# Patient Record
Sex: FEMALE | Race: ASIAN | NOT HISPANIC OR LATINO | URBAN - METROPOLITAN AREA
[De-identification: names, ages, dates, MRNs, and addresses within clinical notes are randomized per-mention and may not be internally consistent; named-entity substitution may affect disease eponyms.]

---

## 2017-02-12 ENCOUNTER — EMERGENCY (EMERGENCY)
Facility: HOSPITAL | Age: 37
LOS: 1 days | Discharge: HOME | End: 2017-02-12
Attending: EMERGENCY MEDICINE | Admitting: EMERGENCY MEDICINE
Payer: MEDICAID

## 2017-02-12 VITALS
TEMPERATURE: 98 F | RESPIRATION RATE: 18 BRPM | SYSTOLIC BLOOD PRESSURE: 118 MMHG | DIASTOLIC BLOOD PRESSURE: 73 MMHG | HEART RATE: 84 BPM | OXYGEN SATURATION: 100 %

## 2017-02-12 PROCEDURE — 99283 EMERGENCY DEPT VISIT LOW MDM: CPT

## 2017-02-12 RX ORDER — AMOXICILLIN 250 MG/5ML
1 SUSPENSION, RECONSTITUTED, ORAL (ML) ORAL
Qty: 20 | Refills: 0 | OUTPATIENT
Start: 2017-02-12 | End: 2017-02-22

## 2017-02-12 NOTE — ED PROVIDER NOTE - OBJECTIVE STATEMENT
36y F with no significant PMHx presents to the ED with sore throat x yesterday. Pt notes dysphagia. Pt states she has had throat infection in the past. Denies fevers, chills, vomiting, cough, nasal congestion, or any other complaints. + Sick contact with children. Allergic to cipro.  used for translation with patient's permission.

## 2017-02-12 NOTE — ED PROVIDER NOTE - NS ED MD SCRIBE ATTENDING SCRIBE SECTIONS
REVIEW OF SYSTEMS/DISPOSITION/PAST MEDICAL/SURGICAL/SOCIAL HISTORY/VITAL SIGNS( Pullset)/PHYSICAL EXAM/HIV/HISTORY OF PRESENT ILLNESS

## 2017-02-12 NOTE — ED PROVIDER NOTE - MEDICAL DECISION MAKING DETAILS
36y F presents with sore throat, tonsillar swelling and exudates. No cough or rhinorrhea. Sick children at home with similar symptoms. Concern for strep, will d/c home with prescription for antibiotics and Prednisone.

## 2018-07-17 ENCOUNTER — EMERGENCY (EMERGENCY)
Facility: HOSPITAL | Age: 38
LOS: 1 days | Discharge: HOME | End: 2018-07-17
Admitting: EMERGENCY MEDICINE
Payer: MEDICAID

## 2018-07-17 VITALS
DIASTOLIC BLOOD PRESSURE: 66 MMHG | RESPIRATION RATE: 18 BRPM | SYSTOLIC BLOOD PRESSURE: 105 MMHG | TEMPERATURE: 98 F | OXYGEN SATURATION: 100 % | HEART RATE: 88 BPM

## 2018-07-17 LAB
APPEARANCE UR: CLEAR — SIGNIFICANT CHANGE UP
BACTERIA # UR AUTO: SIGNIFICANT CHANGE UP
BILIRUB UR-MCNC: NEGATIVE — SIGNIFICANT CHANGE UP
BLOOD UR QL VISUAL: NEGATIVE — SIGNIFICANT CHANGE UP
COLOR SPEC: SIGNIFICANT CHANGE UP
GLUCOSE UR-MCNC: NEGATIVE — SIGNIFICANT CHANGE UP
KETONES UR-MCNC: NEGATIVE — SIGNIFICANT CHANGE UP
LEUKOCYTE ESTERASE UR-ACNC: NEGATIVE — SIGNIFICANT CHANGE UP
NITRITE UR-MCNC: NEGATIVE — SIGNIFICANT CHANGE UP
PH UR: 6.5 — SIGNIFICANT CHANGE UP (ref 4.6–8)
PROT UR-MCNC: NEGATIVE MG/DL — SIGNIFICANT CHANGE UP
RBC CASTS # UR COMP ASSIST: SIGNIFICANT CHANGE UP (ref 0–?)
SP GR SPEC: 1 — LOW (ref 1–1.04)
SQUAMOUS # UR AUTO: SIGNIFICANT CHANGE UP
UROBILINOGEN FLD QL: NORMAL MG/DL — SIGNIFICANT CHANGE UP
WBC UR QL: SIGNIFICANT CHANGE UP (ref 0–?)

## 2018-07-17 PROCEDURE — 99283 EMERGENCY DEPT VISIT LOW MDM: CPT

## 2018-07-17 RX ORDER — CEPHALEXIN 500 MG
1 CAPSULE ORAL
Qty: 14 | Refills: 0 | OUTPATIENT
Start: 2018-07-17 | End: 2018-07-23

## 2018-07-17 RX ORDER — CEPHALEXIN 500 MG
500 CAPSULE ORAL ONCE
Qty: 0 | Refills: 0 | Status: COMPLETED | OUTPATIENT
Start: 2018-07-17 | End: 2018-07-17

## 2018-07-17 RX ADMIN — Medication 500 MILLIGRAM(S): at 20:05

## 2018-07-17 NOTE — ED PROVIDER NOTE - OBJECTIVE STATEMENT
37 yo F denies pmhx here for dysuria x 2 days. reports + urinary frequency + dysuria. no hematuria. no meds taken. denies hx of stone. denies fever chills vomiting diarrhea abdominal pain vaginal bleeding/discharge HA dizziness cp sob weakness

## 2018-07-19 LAB
BACTERIA UR CULT: SIGNIFICANT CHANGE UP
SPECIMEN SOURCE: SIGNIFICANT CHANGE UP

## 2018-07-20 NOTE — ED POST DISCHARGE NOTE - DETAILS
Pt was not home.  As per pt , pt is feeling well and has a follow up appt with Dr. Joel.  Called Dr. Joel office [phone] and faxed results [phone].

## 2018-07-20 NOTE — ED POST DISCHARGE NOTE - RESULT SUMMARY
UCX: Normal genitourinary crow 50,000 - 99,000.  Pt was discharged on cephalexin 500mg BID x 7 days.  Will contact pt to see how she is feeling.

## 2018-10-08 ENCOUNTER — EMERGENCY (EMERGENCY)
Facility: HOSPITAL | Age: 38
LOS: 1 days | Discharge: HOME | End: 2018-10-08
Attending: EMERGENCY MEDICINE | Admitting: EMERGENCY MEDICINE
Payer: MEDICAID

## 2018-10-08 VITALS
RESPIRATION RATE: 16 BRPM | OXYGEN SATURATION: 100 % | SYSTOLIC BLOOD PRESSURE: 142 MMHG | TEMPERATURE: 98 F | HEART RATE: 110 BPM | DIASTOLIC BLOOD PRESSURE: 86 MMHG

## 2018-10-08 PROCEDURE — 99284 EMERGENCY DEPT VISIT MOD MDM: CPT | Mod: 25

## 2018-10-08 NOTE — ED ADULT TRIAGE NOTE - CHIEF COMPLAINT QUOTE
pt comes to ED for burning abdominal pain and now SOB and dizziness t went to PCP and was given gerd meds but the pain did not go away . pt VSS pt appears comfortable

## 2018-10-08 NOTE — ED ADULT TRIAGE NOTE - SOURCE OF INFORMATION
Pt notified that med refill request was approved ans sent to pharmacy  Pt was also made aware that an ov is due, pt state she will call back to schedule  Patient

## 2018-10-09 VITALS
HEART RATE: 95 BPM | SYSTOLIC BLOOD PRESSURE: 122 MMHG | TEMPERATURE: 97 F | RESPIRATION RATE: 17 BRPM | OXYGEN SATURATION: 100 % | DIASTOLIC BLOOD PRESSURE: 71 MMHG

## 2018-10-09 LAB
ALBUMIN SERPL ELPH-MCNC: 4.2 G/DL — SIGNIFICANT CHANGE UP (ref 3.3–5)
ALP SERPL-CCNC: 57 U/L — SIGNIFICANT CHANGE UP (ref 40–120)
ALT FLD-CCNC: 8 U/L — SIGNIFICANT CHANGE UP (ref 4–33)
AST SERPL-CCNC: 10 U/L — SIGNIFICANT CHANGE UP (ref 4–32)
BASOPHILS # BLD AUTO: 0.06 K/UL — SIGNIFICANT CHANGE UP (ref 0–0.2)
BASOPHILS NFR BLD AUTO: 0.6 % — SIGNIFICANT CHANGE UP (ref 0–2)
BILIRUB SERPL-MCNC: < 0.2 MG/DL — LOW (ref 0.2–1.2)
BUN SERPL-MCNC: 9 MG/DL — SIGNIFICANT CHANGE UP (ref 7–23)
CALCIUM SERPL-MCNC: 8.9 MG/DL — SIGNIFICANT CHANGE UP (ref 8.4–10.5)
CHLORIDE SERPL-SCNC: 100 MMOL/L — SIGNIFICANT CHANGE UP (ref 98–107)
CO2 SERPL-SCNC: 24 MMOL/L — SIGNIFICANT CHANGE UP (ref 22–31)
CREAT SERPL-MCNC: 0.76 MG/DL — SIGNIFICANT CHANGE UP (ref 0.5–1.3)
EOSINOPHIL # BLD AUTO: 0 K/UL — SIGNIFICANT CHANGE UP (ref 0–0.5)
EOSINOPHIL NFR BLD AUTO: 0 % — SIGNIFICANT CHANGE UP (ref 0–6)
GLUCOSE SERPL-MCNC: 114 MG/DL — HIGH (ref 70–99)
HCT VFR BLD CALC: 33.9 % — LOW (ref 34.5–45)
HGB BLD-MCNC: 10.4 G/DL — LOW (ref 11.5–15.5)
IMM GRANULOCYTES # BLD AUTO: 0.05 # — SIGNIFICANT CHANGE UP
IMM GRANULOCYTES NFR BLD AUTO: 0.5 % — SIGNIFICANT CHANGE UP (ref 0–1.5)
LIDOCAIN IGE QN: 23.4 U/L — SIGNIFICANT CHANGE UP (ref 7–60)
LYMPHOCYTES # BLD AUTO: 2.51 K/UL — SIGNIFICANT CHANGE UP (ref 1–3.3)
LYMPHOCYTES # BLD AUTO: 23.5 % — SIGNIFICANT CHANGE UP (ref 13–44)
MCHC RBC-ENTMCNC: 26 PG — LOW (ref 27–34)
MCHC RBC-ENTMCNC: 30.7 % — LOW (ref 32–36)
MCV RBC AUTO: 84.8 FL — SIGNIFICANT CHANGE UP (ref 80–100)
MONOCYTES # BLD AUTO: 0.44 K/UL — SIGNIFICANT CHANGE UP (ref 0–0.9)
MONOCYTES NFR BLD AUTO: 4.1 % — SIGNIFICANT CHANGE UP (ref 2–14)
NEUTROPHILS # BLD AUTO: 7.61 K/UL — HIGH (ref 1.8–7.4)
NEUTROPHILS NFR BLD AUTO: 71.3 % — SIGNIFICANT CHANGE UP (ref 43–77)
NRBC # FLD: 0 — SIGNIFICANT CHANGE UP
PLATELET # BLD AUTO: 255 K/UL — SIGNIFICANT CHANGE UP (ref 150–400)
PMV BLD: 10.9 FL — SIGNIFICANT CHANGE UP (ref 7–13)
POTASSIUM SERPL-MCNC: 4.4 MMOL/L — SIGNIFICANT CHANGE UP (ref 3.5–5.3)
POTASSIUM SERPL-SCNC: 4.4 MMOL/L — SIGNIFICANT CHANGE UP (ref 3.5–5.3)
PROT SERPL-MCNC: 7.9 G/DL — SIGNIFICANT CHANGE UP (ref 6–8.3)
RBC # BLD: 4 M/UL — SIGNIFICANT CHANGE UP (ref 3.8–5.2)
RBC # FLD: 14.4 % — SIGNIFICANT CHANGE UP (ref 10.3–14.5)
SODIUM SERPL-SCNC: 138 MMOL/L — SIGNIFICANT CHANGE UP (ref 135–145)
WBC # BLD: 10.67 K/UL — HIGH (ref 3.8–10.5)
WBC # FLD AUTO: 10.67 K/UL — HIGH (ref 3.8–10.5)

## 2018-10-09 PROCEDURE — 76705 ECHO EXAM OF ABDOMEN: CPT | Mod: 26

## 2018-10-09 RX ORDER — LIDOCAINE 4 G/100G
10 CREAM TOPICAL ONCE
Qty: 0 | Refills: 0 | Status: COMPLETED | OUTPATIENT
Start: 2018-10-09 | End: 2018-10-09

## 2018-10-09 RX ORDER — SODIUM CHLORIDE 9 MG/ML
1000 INJECTION INTRAMUSCULAR; INTRAVENOUS; SUBCUTANEOUS ONCE
Qty: 0 | Refills: 0 | Status: COMPLETED | OUTPATIENT
Start: 2018-10-09 | End: 2018-10-09

## 2018-10-09 RX ORDER — FAMOTIDINE 10 MG/ML
20 INJECTION INTRAVENOUS ONCE
Qty: 0 | Refills: 0 | Status: COMPLETED | OUTPATIENT
Start: 2018-10-09 | End: 2018-10-09

## 2018-10-09 RX ORDER — FAMOTIDINE 10 MG/ML
1 INJECTION INTRAVENOUS
Qty: 28 | Refills: 0 | OUTPATIENT
Start: 2018-10-09 | End: 2018-10-22

## 2018-10-09 RX ADMIN — LIDOCAINE 10 MILLILITER(S): 4 CREAM TOPICAL at 01:06

## 2018-10-09 RX ADMIN — FAMOTIDINE 20 MILLIGRAM(S): 10 INJECTION INTRAVENOUS at 01:06

## 2018-10-09 RX ADMIN — Medication 30 MILLILITER(S): at 01:06

## 2018-10-09 RX ADMIN — SODIUM CHLORIDE 1000 MILLILITER(S): 9 INJECTION INTRAMUSCULAR; INTRAVENOUS; SUBCUTANEOUS at 01:06

## 2018-10-09 NOTE — ED ADULT NURSE NOTE - OBJECTIVE STATEMENT
Facilitator RN: Pt aaox4, Maltese speaking, requesting  @ bedside to translate, c/o upper abdominal burning, chest discomfort and nausea w/o emesis since Thursday.  Pt reports pain intermittent past few days, worse after eating, and consistent today.  Pt reports hx CSect years ago, GERD, off antacid medications for >5 years.  Saw GI MD today was told "could be related to anxiety" gave her medications and scheduled endoscopy.  LMP 9/24.  Pt denies urinary symptoms, bm changes or constipation.  IV placed to L AC #20g, labs drawn/sent as ordered, pt medicated and IVF NS Bolus infusing.  UCG running.  Handoff rpt given to covering MARILEE Casanova.

## 2018-10-09 NOTE — ED PROVIDER NOTE - MEDICAL DECISION MAKING DETAILS
EM PGY1 Astrid Hansen MD: 37yo female with PMH of GERD, h pylori infection, anemia presents with abdominal pain for 5 days. Pt is tachycardic but otherwise hemodynamically stable. EKG is otherwise unremarkable. No cardiac history. Unlikely ACS given normal EKG and age. Like GERD exacerbation vs gastritis vs pancreatitis vs other. Plan: RUQ u/s, labs, urine hCG. Will treat with GI cocktail.

## 2018-10-09 NOTE — ED PROVIDER NOTE - ATTENDING CONTRIBUTION TO CARE
MD Flores:  I performed a face to face bedside interview with patient regarding history of present illness, review of symptoms and past medical history. I completed an independent physical exam(documented below).  I have discussed patient's plan of care with resident.   I agree with note as stated above, having amended the EMR as needed to reflect my findings. I have discussed the assessment and plan of care.  This includes during the time I functioned as the attending physician for this patient.  PE:  Gen: Alert, mild distress  Head: NC, AT,  EOMI, normal lids/conjunctiva  ENT:  normal hearing, patent oropharynx without erythema/exudate  Neck: +supple, no tenderness/meningismus/JVD, +Trachea midline  Chest: no chest wall tenderness, equal chest rise  Pulm: Bilateral BS, normal resp effort, no wheeze/stridor/retractions  CV: RRR, no M/R/G, +dist pulses  Abd: +BS, soft, ND, +RUQ ttp >epigastric ttp, no rebound  Rectal: deferred  Mskel: no edema/erythema/cyanosis  Skin: no rash  Neuro: AAOx3  MDM:   39yo F w/ pmh of obesity, GERD, h.pylori, anemia, c/o epigastric pain X approx 5 days, sharp/burning, associated w/ nausea. Pt has been consuming more coffee than usual lately. Saw pmd for these symptoms ystdy and took one tab of omeprazole, comes in today because symptoms have not improved. No cp, no family hx of heart dx, do not suspect ACS. Ddx includes GERD exacerbation vs acute cholecystitis, less likely pancreatitis. Labs, biliary US, meds, reassess.

## 2018-10-09 NOTE — ED PROVIDER NOTE - CARE PLAN
Principal Discharge DX:	GERD (gastroesophageal reflux disease)  Assessment and plan of treatment:	Thank you for visiting our Emergency Department, it has been a pleasure taking part in your healthcare.   You have been seen for abdominal pain. Your blood work and ultrasound were normal.  You were treated with Pepcid, Maalox, IV fluids.   A copy of resulted labs, imaging, and findings have been provided to you.   Please take all discharge medications as prescribed below:    -Omeprazole 20mg once a day  -Pepcid 20mg twice a day for 2 weeks    Follow up with your primary care provider in 24-48 hours.   Please return to the Emergency Department if you experience any new/worsening symptoms, including but are not limited to: unrelenting nausea, vomiting, fever, chills, chest pain, shortness of breath, dizziness, worsening chest or abdominal pain, syncope, headache that does not resolve, or any other concerning symptoms.

## 2018-10-09 NOTE — ED PROVIDER NOTE - PHYSICAL EXAMINATION
EM PGY1 Astrid Hansen MD:   CONSTITUTIONAL: Nontoxic, well nourished, obese young female, resting comfortably in no acute distress  HEAD: Normocephalic; atraumatic  EYES: Normal inspection, EOMI, PERRLA  ENMT: External appears normal; normal oropharynx, TM clear b/l  NECK: Supple; non-tender; no cervical lymphadenopathy  CARD: RRR; no audible murmurs, rubs, or gallops  RESP: No respiratory distress, lungs ctab/l  ABD: Soft, non-distended; TTP diffusely; no rebound or guarding, no organomegaly  EXT: No LE pitting edema or calf tenderness; distal pulses intact with good capillary refill  SKIN: Warm, dry, intact  NEURO: aaox3, 5/5 strength b/l UE and LE, no gross motor or sensory defects noted EM PGY1 Astrid Hansen MD:   CONSTITUTIONAL: Nontoxic, well nourished, obese young female, resting comfortably in no acute distress, occasional burping   HEAD: Normocephalic; atraumatic  EYES: Normal inspection, EOMI, PERRLA  ENMT: External appears normal; normal oropharynx, TM clear b/l  NECK: Supple; non-tender; no cervical lymphadenopathy  CARD: RRR; no audible murmurs, rubs, or gallops  RESP: No respiratory distress, lungs ctab/l  ABD: Soft, non-distended; TTP diffusely; no rebound or guarding, no organomegaly  EXT: No LE pitting edema or calf tenderness; distal pulses intact with good capillary refill  SKIN: Warm, dry, intact  NEURO: aaox3, 5/5 strength b/l UE and LE, no gross motor or sensory defects noted

## 2018-10-09 NOTE — ED PROVIDER NOTE - OBJECTIVE STATEMENT
EM PGY1 Astrid Hansen MD: EM PGY1 Astrid Hansen MD: 39yo female with PMH of GERD, h pylori infection, anemia presents with abdominal pain for 5 days. Pain is in epigastric region, burning and squeezing in nature, intermittent 8/10 lasting 3-4 minutes, worsens at rest and with PO intake. Pt EM PGY1 Astrid Hansen MD: 39yo female with PMH of GERD, h pylori infection, anemia presents with abdominal pain for 5 days. Pain is in epigastric region, burning and squeezing in nature, intermittent 8/10 lasting 3-4 minutes, worsens at rest and with PO intake. Seen by PCP today, was prescribed omeprazole. Pt admits to SOB, nausea and HA with the pain. No diaphoresis, vomiting, syncope, lightheadedness, palpitations, diarrhea, hematochezia, melena, dysuria, hematuria.

## 2018-10-09 NOTE — ED PROVIDER NOTE - NS ED ROS FT
EM PGY1 Astrid Hansen MD:   General: denies fever, chills  HENT: denies nasal congestion, sore throat, rhinorrhea  Eyes: denies vision changes  CV: +chest pain  Resp: +difficulty breathing, denies cough  Abdominal: +nausea, +abdominal pain, denies vomiting, diarrhea, blood in stool, dark stool  : denies pain with urination  MSK: denies recent trauma  Neuro: +headaches, +numbness, +dizziness, +lightheadedness, denies tingling  Skin: denies new rashes  Endocrine: denies recent weight loss

## 2018-10-09 NOTE — ED PROVIDER NOTE - PLAN OF CARE
Thank you for visiting our Emergency Department, it has been a pleasure taking part in your healthcare.   You have been seen for abdominal pain. Your blood work and ultrasound were normal.  You were treated with Pepcid, Maalox, IV fluids.   A copy of resulted labs, imaging, and findings have been provided to you.   Please take all discharge medications as prescribed below:    -Omeprazole 20mg once a day  -Pepcid 20mg twice a day for 2 weeks    Follow up with your primary care provider in 24-48 hours.   Please return to the Emergency Department if you experience any new/worsening symptoms, including but are not limited to: unrelenting nausea, vomiting, fever, chills, chest pain, shortness of breath, dizziness, worsening chest or abdominal pain, syncope, headache that does not resolve, or any other concerning symptoms.

## 2022-01-18 NOTE — ED ADULT NURSE NOTE - CCCP TRG CHIEF CMPLNT
Is This A New Presentation, Or A Follow-Up?: Follow Up Hemangioma(s)
Additional History: Patient is here for cosmetic ED of hemangiomas. Pt signed cosmetic consent at last OV.
multiple medical complaints

## 2023-05-25 NOTE — ED ADULT NURSE NOTE - HEART RATE (BEATS/MIN)
95 Keystone Flap Text: The defect edges were debeveled with a #15 scalpel blade.  Given the location of the defect, shape of the defect a keystone flap was deemed most appropriate.  Using a sterile surgical marker, an appropriate keystone flap was drawn incorporating the defect, outlining the appropriate donor tissue and placing the expected incisions within the relaxed skin tension lines where possible. The area thus outlined was incised deep to adipose tissue with a #15 scalpel blade.  The skin margins were undermined to an appropriate distance in all directions around the primary defect and laterally outward around the flap utilizing iris scissors.
